# Patient Record
Sex: FEMALE | Race: WHITE | Employment: UNEMPLOYED | URBAN - METROPOLITAN AREA
[De-identification: names, ages, dates, MRNs, and addresses within clinical notes are randomized per-mention and may not be internally consistent; named-entity substitution may affect disease eponyms.]

---

## 2022-11-23 ENCOUNTER — HOSPITAL ENCOUNTER (EMERGENCY)
Age: 62
Discharge: HOME | End: 2022-11-23
Payer: MEDICAID

## 2022-11-23 ENCOUNTER — APPOINTMENT (OUTPATIENT)
Dept: GENERAL RADIOLOGY | Age: 62
End: 2022-11-23
Payer: MEDICAID

## 2022-11-23 VITALS
SYSTOLIC BLOOD PRESSURE: 141 MMHG | HEART RATE: 85 BPM | DIASTOLIC BLOOD PRESSURE: 83 MMHG | WEIGHT: 176.59 LBS | TEMPERATURE: 97.2 F | BODY MASS INDEX: 29.42 KG/M2 | OXYGEN SATURATION: 95 % | HEIGHT: 65 IN | RESPIRATION RATE: 17 BRPM

## 2022-11-23 DIAGNOSIS — S61.217A LACERATION OF LEFT LITTLE FINGER WITHOUT FOREIGN BODY WITHOUT DAMAGE TO NAIL, INITIAL ENCOUNTER: Primary | ICD-10-CM

## 2022-11-23 DIAGNOSIS — Z23 TETANUS TOXOID VACCINATION ADMINISTERED AT CURRENT VISIT: ICD-10-CM

## 2022-11-23 PROCEDURE — 2500000003 HC RX 250 WO HCPCS: Performed by: PHYSICIAN ASSISTANT

## 2022-11-23 PROCEDURE — 99284 EMERGENCY DEPT VISIT MOD MDM: CPT

## 2022-11-23 PROCEDURE — 12002 RPR S/N/AX/GEN/TRNK2.6-7.5CM: CPT

## 2022-11-23 PROCEDURE — 6360000002 HC RX W HCPCS: Performed by: PHYSICIAN ASSISTANT

## 2022-11-23 PROCEDURE — 6370000000 HC RX 637 (ALT 250 FOR IP): Performed by: PHYSICIAN ASSISTANT

## 2022-11-23 PROCEDURE — 73140 X-RAY EXAM OF FINGER(S): CPT

## 2022-11-23 PROCEDURE — 90471 IMMUNIZATION ADMIN: CPT | Performed by: PHYSICIAN ASSISTANT

## 2022-11-23 PROCEDURE — 90715 TDAP VACCINE 7 YRS/> IM: CPT | Performed by: PHYSICIAN ASSISTANT

## 2022-11-23 RX ORDER — LIDOCAINE HYDROCHLORIDE 20 MG/ML
5 INJECTION, SOLUTION INFILTRATION; PERINEURAL ONCE
Status: COMPLETED | OUTPATIENT
Start: 2022-11-23 | End: 2022-11-23

## 2022-11-23 RX ORDER — BACITRACIN, NEOMYCIN, POLYMYXIN B 400; 3.5; 5 [USP'U]/G; MG/G; [USP'U]/G
OINTMENT TOPICAL ONCE
Status: COMPLETED | OUTPATIENT
Start: 2022-11-23 | End: 2022-11-23

## 2022-11-23 RX ADMIN — TETANUS TOXOID, REDUCED DIPHTHERIA TOXOID AND ACELLULAR PERTUSSIS VACCINE, ADSORBED 0.5 ML: 5; 2.5; 8; 8; 2.5 SUSPENSION INTRAMUSCULAR at 07:36

## 2022-11-23 RX ADMIN — BACITRACIN ZINC, NEOMYCIN, POLYMYXIN B 1 G: 400; 3.5; 5 OINTMENT TOPICAL at 09:55

## 2022-11-23 RX ADMIN — LIDOCAINE HYDROCHLORIDE 5 ML: 20 INJECTION, SOLUTION INFILTRATION; PERINEURAL at 10:00

## 2022-11-23 ASSESSMENT — PAIN DESCRIPTION - LOCATION: LOCATION: FINGER (COMMENT WHICH ONE)

## 2022-11-23 ASSESSMENT — PAIN SCALES - GENERAL
PAINLEVEL_OUTOF10: 3
PAINLEVEL_OUTOF10: 6

## 2022-11-23 ASSESSMENT — PAIN - FUNCTIONAL ASSESSMENT: PAIN_FUNCTIONAL_ASSESSMENT: 0-10

## 2022-11-23 ASSESSMENT — PAIN DESCRIPTION - ORIENTATION: ORIENTATION: LEFT

## 2022-11-23 NOTE — ED PROVIDER NOTES
[address]  [address] [address]  Dept: [phone]  Loc: [address] ENCOUNTER        This patient was not seen or evaluated by the attending physician. I evaluated this patient, the attending physician was available for consultation. CHIEF COMPLAINT    Chief Complaint   Patient presents with    Laceration     Left pinky, anterior near palm. Tetanus out of date. Tripped, denies hitting head or LOC. Caught self with hands, denies wrist pain. HPI    Martin Fermin is a 63 y.o. female who lacerated her left pinky finger. The mechanism of the injury was that she tripped earlier today and cut it on an air register. This occurred shortly prior to arrival.  Associated bleeding was alleviated by pressure. The pain is 3/10 in severity. She is unsure of her last tetanus shot. She is right hand dominant. She has no further complaints. REVIEW OF SYSTEMS    Neurologic: No numbness or weakness distal to the wound  Skin: see HPI  Musculoskeletal: No bony deformity  Immunization: Tetanus status unknown, will be updated in the ED    PAST MEDICAL & SURGICAL HISTORY    No past medical history on file. No past surgical history on file. CURRENT MEDICATIONS        ALLERGIES    Not on File    SOCIAL & FAMILY HISTORY    Social History     Socioeconomic History    Marital status:      No family history on file.     PHYSICAL EXAM    VITAL SIGNS: BP (!) 141/83   Pulse 85   Temp 97.2 °F (36.2 °C) (Oral)   Resp 17   Ht 5' 5\" (1.651 m)   Wt 176 lb 9.4 oz (80.1 kg)   SpO2 95%   BMI 29.39 kg/m²   Constitutional:  Well developed, well-nourished  HENT:  atraumatic, no trismus  NECK:  Supple, No neck swelling  Respiratory:  No respiratory distress  Cardiovascular:  No JVD   Neurologic: Motor and sensory distal to the wound is intact and normal, patient is awake, alert, no slurred speech  Vascular: left radial pulse 2+, capillary refill less than 2 seconds  Musculoskeletal:  No edema or deformity  Integument:  +3.5 cm laceration localized over the palmar aspect of the left hand near the 5th finger, the depth down to the subcutaneous tissue, there is no foreign body in the wound, there is no tendon or bone exposed in the wound. NVS intact distally. PROCEDURE  Laceration Repair Procedure Note  Performed by: myself  Consent:  Verbal consent obtained by patient. Risk of infection and pain discussed, as well as alternatives. Anesthesia:  The patient was placed in the appropriate position and anesthesia obtained by local infiltration using 2% Lidocaine without epinephrine. Repair type:  simple   Pre-procedure:  Patient was prepped and draped in usual sterile fashion  Laceration details:  Location: left pinky finger  Length (cm):  3.5  Preparation:  Patient was prepped and draped in usual sterile fashion  Exploration: Hemostasis achieved with direct pressure, entire depth of wound probed and visualized   Contaminated: no  Treatment:  Amount of cleaning:  Standard  Irrigation solution:  High pressure saline  Visualized foreign bodies/material removed: no  Skin repair:   Repair method:  Sutures  Suture size:  4-0  Suture material:  Nylon  Suture technique:  Simple interrupted  Approximation:  Close  Number of sutures: 8  Dressing:  Sterile dressing and antibiotic ointment  Patient tolerance of procedure: Tolerated well, no immediate complications    RADIOLOGY  XR FINGER LEFT (MIN 2 VIEWS)   Final Result   Soft tissue laceration at the base of the 5th digit with no acute osseous   abnormality or radiopaque foreign body.              ED COURSE & MEDICAL DECISION MAKING    See chart for details of any medications ordered    Differential diagnosis: Tendon laceration, neurologic injury, vascular injury, involvement of bone that could lead to osteomyelitis, retained foreign body, delayed bacterial skin infection, other    No evidence of neurovascular injury on physical exam.  No evidence of tendon laceration. Plain films as above with no acute osseous abnormality. The patient was instructed to follow up as an outpatient in 2 days, and to return in 10-14 days for suture removal.  The patient was instructed to return to the ED immediately for any new or worsening symptoms. The patient verbalized understanding. FINAL IMPRESSION    1. Laceration of left little finger without foreign body without damage to nail, initial encounter    2.  Tetanus toxoid vaccination administered at current visit        PLAN  Discharge with outpatient follow-up (see EMR)      (Please note that this note was completed with a voice recognition program.  Every attempt was made to edit the dictations, but inevitably there remain words that are mis-transcribed.)        Ag Segovia  11/23/22 0607

## 2022-11-23 NOTE — DISCHARGE INSTRUCTIONS
Follow-up with a medical professional for suture removal in the following length of time based on suture location:     Scalp: 10 days  Face, Ear, Eyebrow, Nose, Lip: 5 days  Eyelid: 3 days  Chest and Abdomen: 8-10 days  Back: 12-14 days (10-12 days in children)  Extremities: 10-14 days (8-10 days in children)  ** Hand: 10-14 days **  Foot and sole: 12-14 days (8-10 days in children)    Keep wound clean and dry. Change dressing twice daily. Return with any new or worrisome signs or symptoms.

## 2022-12-02 ENCOUNTER — APPOINTMENT (OUTPATIENT)
Dept: CT IMAGING | Age: 62
End: 2022-12-02
Payer: MEDICAID

## 2022-12-02 ENCOUNTER — APPOINTMENT (OUTPATIENT)
Dept: GENERAL RADIOLOGY | Age: 62
End: 2022-12-02
Payer: MEDICAID

## 2022-12-02 ENCOUNTER — HOSPITAL ENCOUNTER (EMERGENCY)
Age: 62
Discharge: HOME | End: 2022-12-02
Attending: EMERGENCY MEDICINE
Payer: MEDICAID

## 2022-12-02 VITALS
DIASTOLIC BLOOD PRESSURE: 74 MMHG | SYSTOLIC BLOOD PRESSURE: 126 MMHG | WEIGHT: 175.04 LBS | HEART RATE: 59 BPM | BODY MASS INDEX: 29.16 KG/M2 | RESPIRATION RATE: 18 BRPM | HEIGHT: 65 IN | OXYGEN SATURATION: 99 % | TEMPERATURE: 98 F

## 2022-12-02 DIAGNOSIS — R55 SYNCOPE AND COLLAPSE: Primary | ICD-10-CM

## 2022-12-02 LAB
A/G RATIO: 1.6 (ref 1.1–2.2)
ALBUMIN SERPL-MCNC: 4.2 G/DL (ref 3.4–5)
ALP BLD-CCNC: 54 U/L (ref 40–129)
ALT SERPL-CCNC: 14 U/L (ref 10–40)
ANION GAP SERPL CALCULATED.3IONS-SCNC: 11 MMOL/L (ref 3–16)
AST SERPL-CCNC: 26 U/L (ref 15–37)
BACTERIA: ABNORMAL /HPF
BASOPHILS ABSOLUTE: 0.1 K/UL (ref 0–0.2)
BASOPHILS RELATIVE PERCENT: 0.6 %
BILIRUB SERPL-MCNC: <0.2 MG/DL (ref 0–1)
BILIRUBIN URINE: NEGATIVE
BLOOD, URINE: NEGATIVE
BUN BLDV-MCNC: 16 MG/DL (ref 7–20)
CALCIUM SERPL-MCNC: 9.6 MG/DL (ref 8.3–10.6)
CHLORIDE BLD-SCNC: 101 MMOL/L (ref 99–110)
CLARITY: ABNORMAL
CO2: 24 MMOL/L (ref 21–32)
COLOR: YELLOW
COMMENT UA: ABNORMAL
CREAT SERPL-MCNC: 0.8 MG/DL (ref 0.6–1.2)
EOSINOPHILS ABSOLUTE: 0.2 K/UL (ref 0–0.6)
EOSINOPHILS RELATIVE PERCENT: 1.2 %
EPITHELIAL CELLS, UA: 11 /HPF (ref 0–5)
GFR SERPL CREATININE-BSD FRML MDRD: >60 ML/MIN/{1.73_M2}
GLUCOSE BLD-MCNC: 128 MG/DL (ref 70–99)
GLUCOSE URINE: NEGATIVE MG/DL
HCT VFR BLD CALC: 42.3 % (ref 36–48)
HEMOGLOBIN: 13.9 G/DL (ref 12–16)
HYALINE CASTS: 2 /LPF (ref 0–8)
KETONES, URINE: 15 MG/DL
LEUKOCYTE ESTERASE, URINE: ABNORMAL
LIPASE: 48 U/L (ref 13–60)
LYMPHOCYTES ABSOLUTE: 2 K/UL (ref 1–5.1)
LYMPHOCYTES RELATIVE PERCENT: 12.6 %
MCH RBC QN AUTO: 30.9 PG (ref 26–34)
MCHC RBC AUTO-ENTMCNC: 32.9 G/DL (ref 31–36)
MCV RBC AUTO: 94 FL (ref 80–100)
MICROSCOPIC EXAMINATION: YES
MONOCYTES ABSOLUTE: 0.8 K/UL (ref 0–1.3)
MONOCYTES RELATIVE PERCENT: 4.9 %
NEUTROPHILS ABSOLUTE: 12.9 K/UL (ref 1.7–7.7)
NEUTROPHILS RELATIVE PERCENT: 80.7 %
NITRITE, URINE: NEGATIVE
PDW BLD-RTO: 13.4 % (ref 12.4–15.4)
PH UA: 6 (ref 5–8)
PLATELET # BLD: 331 K/UL (ref 135–450)
PMV BLD AUTO: 8.8 FL (ref 5–10.5)
POTASSIUM SERPL-SCNC: 4.7 MMOL/L (ref 3.5–5.1)
PROTEIN UA: 100 MG/DL
RBC # BLD: 4.5 M/UL (ref 4–5.2)
RBC UA: ABNORMAL /HPF (ref 0–4)
SODIUM BLD-SCNC: 136 MMOL/L (ref 136–145)
SPECIFIC GRAVITY UA: 1.02 (ref 1–1.03)
TOTAL PROTEIN: 6.8 G/DL (ref 6.4–8.2)
TROPONIN: <0.01 NG/ML
URINE REFLEX TO CULTURE: YES
URINE TYPE: ABNORMAL
UROBILINOGEN, URINE: 0.2 E.U./DL
WBC # BLD: 16 K/UL (ref 4–11)
WBC UA: 15 /HPF (ref 0–5)

## 2022-12-02 PROCEDURE — 83690 ASSAY OF LIPASE: CPT

## 2022-12-02 PROCEDURE — 72125 CT NECK SPINE W/O DYE: CPT

## 2022-12-02 PROCEDURE — 71046 X-RAY EXAM CHEST 2 VIEWS: CPT

## 2022-12-02 PROCEDURE — 87086 URINE CULTURE/COLONY COUNT: CPT

## 2022-12-02 PROCEDURE — 93005 ELECTROCARDIOGRAM TRACING: CPT | Performed by: PHYSICIAN ASSISTANT

## 2022-12-02 PROCEDURE — 80053 COMPREHEN METABOLIC PANEL: CPT

## 2022-12-02 PROCEDURE — 85025 COMPLETE CBC W/AUTO DIFF WBC: CPT

## 2022-12-02 PROCEDURE — 84484 ASSAY OF TROPONIN QUANT: CPT

## 2022-12-02 PROCEDURE — 81001 URINALYSIS AUTO W/SCOPE: CPT

## 2022-12-02 PROCEDURE — 99285 EMERGENCY DEPT VISIT HI MDM: CPT

## 2022-12-02 PROCEDURE — 70450 CT HEAD/BRAIN W/O DYE: CPT

## 2022-12-02 PROCEDURE — 70486 CT MAXILLOFACIAL W/O DYE: CPT

## 2022-12-02 RX ORDER — ONDANSETRON 2 MG/ML
4 INJECTION INTRAMUSCULAR; INTRAVENOUS ONCE
Status: DISCONTINUED | OUTPATIENT
Start: 2022-12-02 | End: 2022-12-03 | Stop reason: HOSPADM

## 2022-12-02 ASSESSMENT — PAIN DESCRIPTION - DESCRIPTORS: DESCRIPTORS: ACHING;THROBBING

## 2022-12-02 ASSESSMENT — ENCOUNTER SYMPTOMS
SHORTNESS OF BREATH: 0
ABDOMINAL PAIN: 0
NAUSEA: 1
VOMITING: 1
COLOR CHANGE: 0

## 2022-12-02 ASSESSMENT — PAIN DESCRIPTION - LOCATION: LOCATION: MOUTH

## 2022-12-02 ASSESSMENT — PAIN DESCRIPTION - FREQUENCY: FREQUENCY: CONTINUOUS

## 2022-12-02 ASSESSMENT — LIFESTYLE VARIABLES
HOW OFTEN DO YOU HAVE A DRINK CONTAINING ALCOHOL: NEVER
HOW MANY STANDARD DRINKS CONTAINING ALCOHOL DO YOU HAVE ON A TYPICAL DAY: PATIENT DOES NOT DRINK

## 2022-12-02 ASSESSMENT — PAIN DESCRIPTION - ONSET: ONSET: AWAKENED FROM SLEEP

## 2022-12-02 ASSESSMENT — PAIN - FUNCTIONAL ASSESSMENT: PAIN_FUNCTIONAL_ASSESSMENT: 0-10

## 2022-12-02 ASSESSMENT — PAIN SCALES - GENERAL: PAINLEVEL_OUTOF10: 6

## 2022-12-02 ASSESSMENT — PAIN DESCRIPTION - PAIN TYPE: TYPE: ACUTE PAIN

## 2022-12-02 NOTE — ED TRIAGE NOTES
Pt refused to come to exam room in [facility] d/t daughter being in [facility], will recheck with pt

## 2022-12-03 LAB
EKG ATRIAL RATE: 60 BPM
EKG DIAGNOSIS: NORMAL
EKG P AXIS: 47 DEGREES
EKG P-R INTERVAL: 116 MS
EKG Q-T INTERVAL: 434 MS
EKG QRS DURATION: 90 MS
EKG QTC CALCULATION (BAZETT): 434 MS
EKG R AXIS: 59 DEGREES
EKG T AXIS: 59 DEGREES
EKG VENTRICULAR RATE: 60 BPM

## 2022-12-03 PROCEDURE — 93010 ELECTROCARDIOGRAM REPORT: CPT | Performed by: INTERNAL MEDICINE

## 2022-12-03 NOTE — ED PROVIDER NOTES
[address] [facility]      Pt Name: Cullen Roberts  MRN: [id number]  [place] 1960  Date of evaluation: 12/2/2022  Provider: MARIAA Madera    This patient was seen and evaluated by the attending physician Dr. Norma Agarwal. CHIEF COMPLAINT       Chief Complaint   Patient presents with    Loss of Consciousness     About two hours ago, she was changing her bandage to her left hand, she passed out hit her lower lip, since then has been fatigued        CRITICAL CARE TIME   I performed a total Critical Care time of  15 minutes, excluding separately reportable procedures. There was a high probability of clinically significant/life threatening deterioration in the patient's condition which required my urgent intervention. Not limited to multiple reexaminations, discussions with attending physician and consultants. HISTORY OF PRESENT ILLNESS  (Location/Symptom, Timing/Onset, Context/Setting, Quality, Duration, Modifying Factors, Severity.)   Cullen Roberts is a 63 y.o. female who presents to the emergency department after passing out twice. She tells me that she walked her dogs 2 blocks this evening and felt fine. She was sitting in chair cleaning her wound to her left hand where she got stitches a little over a week ago. She tells me that that sometimes makes her feel nauseous but she suddenly got dizzy like the room was spinning. After that she woke up on the floor lying on her face. Her  did not report any seizure-like activity. She did not bite her tongue but she has a wound to her lip where her face hit. She tells me that they got her up and she was back sitting in a chair when she got dizzy again. She tells me she got very sweaty and passed out on the chair briefly again. No bowel or bladder incontinence. She tells me since then she is felt very drained and nauseous and has vomited a couple times.   Denies specific chest pain or shortness of breath. Does not regularly follow with a primary care physician so is unaware of chronic medical problems. No known prior history of stroke DVT or PE. Nursing Notes were reviewed and I agree. REVIEW OF SYSTEMS    (2-9 systems for level 4, 10 or more for level 5)     Review of Systems   Constitutional:  Positive for fatigue. Negative for fever. Respiratory:  Negative for shortness of breath. Cardiovascular:  Negative for chest pain. Gastrointestinal:  Positive for nausea and vomiting. Negative for abdominal pain. Skin:  Negative for color change and wound. Neurological:  Positive for syncope and weakness. Negative for dizziness (resolved), seizures and numbness. Psychiatric/Behavioral:  Negative for agitation, behavioral problems and confusion. Except as noted above the remainder of the review of systems was reviewed and negative. PAST MEDICAL HISTORY         Diagnosis Date    Bursitis disorder        SURGICAL HISTORY     History reviewed. No pertinent surgical history. CURRENT MEDICATIONS       There are no discharge medications for this patient. ALLERGIES     Codeine    FAMILY HISTORY     History reviewed. No pertinent family history. No family status information on file. SOCIAL HISTORY      reports that she has been smoking cigarettes. She has been smoking an average of .5 packs per day. She has been exposed to tobacco smoke. She has never used smokeless tobacco. She reports that she does not currently use alcohol. She reports that she does not currently use drugs. PHYSICAL EXAM    (up to 7 for level 4, 8 or more for level 5)     ED Triage Vitals [12/02/22 1840]   BP Temp Temp Source Heart Rate Resp SpO2 Height Weight   126/74 98 °F (36.7 °C) Oral 59 18 99 % 5' 5\" (1.651 m) 175 lb 0.7 oz (79.4 kg)       Physical Exam  Vitals and nursing note reviewed. Constitutional:       Appearance: Normal appearance.    HENT:      Head: Normocephalic and atraumatic. Mouth/Throat:      Mouth: Mucous membranes are moist.   Eyes:      Pupils: Pupils are equal, round, and reactive to light. Cardiovascular:      Rate and Rhythm: Normal rate. Pulses: Normal pulses. Pulmonary:      Effort: Pulmonary effort is normal. No respiratory distress. Abdominal:      Tenderness: There is no abdominal tenderness. There is no guarding. Musculoskeletal:         General: Normal range of motion. Cervical back: Normal range of motion. Skin:     General: Skin is warm. Neurological:      General: No focal deficit present. Mental Status: She is alert and oriented to person, place, and time. Cranial Nerves: No cranial nerve deficit. Sensory: No sensory deficit. Motor: No weakness. Gait: Gait normal.   Psychiatric:         Mood and Affect: Mood normal.         Behavior: Behavior normal.       DIAGNOSTIC RESULTS     EKG: All EKG's are interpreted by MARIAA Vanegas in the absence of a cardiologist.    EKG interpreted by myself - please refer to attending physician's note for complete EKG interpretation:    No evidence of acute ischemia or injury. RADIOLOGY:   Non-plain film images such as CT, Ultrasound and MRI are read by the radiologist. Plain radiographic images are visualized and preliminarily interpreted by MARIAA Vanegas with the below findings:    Reviewed radiologist's interpretation. Interpretation per the Radiologist below, if available at the time of this note:    CT HEAD WO CONTRAST   Final Result   No acute intracranial abnormality. No acute traumatic injury of the facial bones. CT CERVICAL SPINE WO CONTRAST   Final Result   No acute abnormality of the cervical spine. CT FACIAL BONES WO CONTRAST   Final Result   No acute intracranial abnormality. No acute traumatic injury of the facial bones.          XR CHEST (2 VW)   Final Result   No significant findings in the chest. LABS:  Labs Reviewed   CBC WITH AUTO DIFFERENTIAL - Abnormal; Notable for the following components:       Result Value    WBC 16.0 (*)     Neutrophils Absolute 12.9 (*)     All other components within normal limits   COMPREHENSIVE METABOLIC PANEL - Abnormal; Notable for the following components:    Glucose 128 (*)     All other components within normal limits   URINALYSIS WITH REFLEX TO CULTURE - Abnormal; Notable for the following components:    Clarity, UA CLOUDY (*)     Ketones, Urine 15 (*)     Protein,  (*)     Leukocyte Esterase, Urine TRACE (*)     All other components within normal limits   MICROSCOPIC URINALYSIS - Abnormal; Notable for the following components:    Bacteria, UA 1+ (*)     WBC, UA 15 (*)     Epithelial Cells, UA 11 (*)     All other components within normal limits   CULTURE, URINE   TROPONIN   LIPASE       All other labs were within normal range or not returned as of this dictation. EMERGENCY DEPARTMENT COURSE and DIFFERENTIAL DIAGNOSIS/MDM:   Vitals:    Vitals:    12/02/22 1840   BP: 126/74   Pulse: 59   Resp: 18   Temp: 98 °F (36.7 °C)   TempSrc: Oral   SpO2: 99%   Weight: 175 lb 0.7 oz (79.4 kg)   Height: 5' 5\" (1.651 m)     The patient is afebrile not tachycardic not hypoxic. I talked with this patient at least 4 or 5 times told her that her story is concerning that I am concerned she could have had an arrhythmia or other cardiac event, she tells me that she feels fine and she thinks she just got sick from looking at her wound. However I discussed with her she was seated this does not fit with vasovagal syncope. Both the attending physician and myself recommended admission she understand she is leaving against medical advice but continues to desire to do so. She did tell me that she was hungry which I told her I would be more than happy to get her something to eat but she declined.   She is alert and oriented x4 has capacity to make decisions. CONSULTS:  None    PROCEDURES:  Procedures      FINAL IMPRESSION      1. Syncope and collapse          DISPOSITION/PLAN   DISPOSITION [place] 12/02/2022 10:18:09 PM      PATIENT REFERRED TO:  No follow-up provider specified. DISCHARGE MEDICATIONS:  There are no discharge medications for this patient.       (Please note that portions of this note were completed with a voice recognition program.  Efforts were made to edit the dictations but occasionally words are mis-transcribed.)    Binh Britotn, [address], [place]  12/03/22 0005

## 2022-12-03 NOTE — ED PROVIDER NOTES
[address] [facility]      Pt Name: Brooklyn Quarles  MRN: [id number]  [place] 1960  Date of evaluation: 12/2/2022  Provider: Tito Freeman Bronston Sae  Chief Complaint   Patient presents with    Loss of Consciousness     About two hours ago, she was changing her bandage to her left hand, she passed out hit her lower lip, since then has been fatigued        I have fully participated in the care of Brooklyn Quarles and have had a face-to-face evaluation. I have reviewed and agree with all pertinent clinical information, and midlevel provider's history, and physical exam. I have also reviewed the labs, EKG, and imaging studies and treatment plan. I have also reviewed and agree with the medications, allergies and past medical history section for this Brooklyn Quarles. I agree with the diagnosis, and I concur. This patient is at risk for a communicable infection. Therefore, personal protection equipment consisting of a mask was worn for the exam.    Past Medical History:   Diagnosis Date    Bursitis disorder        MDM:  Brooklyn Quarles is a 63 y.o. female who presents with near syncopal episode that occurred today. She was cleaning her wound and then felt dizzy and lightheaded and almost passed out. She denies any chest pains or shortness of breath. She denies any fever or chills. She denies any nausea or vomiting. Nothing makes it better or worse. She describes it as moderate. She denies a history of diabetes. She did not fall or injure herself. Physical exam is unremarkable. There are no focal neurologic signs. Heart is regular rate and rhythm without murmurs clicks or rubs. Lungs are clear to auscultation equal bilaterally. Abdome urine was cloudy with 1+ bacteria 15 white cells. Remainder of her work-up was negative. Her EKG was nonacute. Therefore, we offered patient admission at this time but she refused admission. Therefore, she was signed out [address] for instructed follow-up with her doctor and return if any problems. N soft and nontender. Mucous membranes are moist.  Laboratory work revealed a white count of 16,000. Vitals:    12/02/22 1840   BP: 126/74   Pulse: 59   Resp: 18   Temp: 98 °F (36.7 °C)   SpO2: 99%       Lab results  Labs Reviewed   CBC WITH AUTO DIFFERENTIAL - Abnormal; Notable for the following components:       Result Value    WBC 16.0 (*)     Neutrophils Absolute 12.9 (*)     All other components within normal limits   COMPREHENSIVE METABOLIC PANEL - Abnormal; Notable for the following components:    Glucose 128 (*)     All other components within normal limits   URINALYSIS WITH REFLEX TO CULTURE - Abnormal; Notable for the following components:    Clarity, UA CLOUDY (*)     Ketones, Urine 15 (*)     Protein,  (*)     Leukocyte Esterase, Urine TRACE (*)     All other components within normal limits   MICROSCOPIC URINALYSIS - Abnormal; Notable for the following components:    Bacteria, UA 1+ (*)     WBC, UA 15 (*)     Epithelial Cells, UA 11 (*)     All other components within normal limits   CULTURE, URINE   TROPONIN   LIPASE       EKG Results  EKG Interpretation    Interpreted by emergency department physician  Time performed: 9961  Time read: 1943    Rhythm: Sinus  Ventricular Rate: 60  QRS Axis: 59  Ectopy: None  Conduction: Normal sinus rhythm  ST Segments: normal  T Waves: normal  Q Waves: None    Other findings: Motion artifact lead EKG is readable    Compared to EKG on: None to compare    Clinical Impression: Normal sinus rhythm, normal EKG. There is motion artifact but EKG is readable. There is no old EKG to compare. Olya Ace DO    Radiology results  CT HEAD WO CONTRAST   Final Result   No acute intracranial abnormality. No acute traumatic injury of the facial bones.          CT CERVICAL SPINE WO CONTRAST   Final Result   No acute abnormality of the cervical spine. CT FACIAL BONES WO CONTRAST   Final Result   No acute intracranial abnormality. No acute traumatic injury of the facial bones. XR CHEST (2 VW)   Final Result   No significant findings in the chest.             See discharge instructions for specific medications, discharge information, and treatments. They were verbally instructed to return to emergency if any problems. Medications   ondansetron (ZOFRAN) injection 4 mg (4 mg IntraVENous Not Given 12/2/22 1932)       There are no discharge medications for this patient. The patient's blood pressure was found to be elevated according to CMS/Medicare and the Affordable Care Act/ObamaCare criteria. Elevated blood pressure could occur because of pain or anxiety or other reasons and does not mean that they need to have their blood pressure treated or medications otherwise adjusted. However, this could also be a sign that they will need to have their blood pressure treated or medications changed. The patient was instructed to follow up closely with their personal physician to have their blood pressure rechecked. The patient was instructed to take a list of recent blood pressure readings to their next visit with their personal physician. IMPRESSION(S):  1. Syncope and collapse      ?        Shantell Gonzales DO  12/02/22 4317

## 2022-12-04 LAB — URINE CULTURE, ROUTINE: NORMAL
